# Patient Record
Sex: MALE | Race: OTHER | URBAN - METROPOLITAN AREA
[De-identification: names, ages, dates, MRNs, and addresses within clinical notes are randomized per-mention and may not be internally consistent; named-entity substitution may affect disease eponyms.]

---

## 2017-03-17 ENCOUNTER — OFFICE VISIT (OUTPATIENT)
Dept: INTERNAL MEDICINE CLINIC | Facility: CLINIC | Age: 47
End: 2017-03-17

## 2017-03-17 VITALS
HEIGHT: 65 IN | HEART RATE: 53 BPM | TEMPERATURE: 97 F | BODY MASS INDEX: 22.49 KG/M2 | RESPIRATION RATE: 12 BRPM | WEIGHT: 135 LBS | DIASTOLIC BLOOD PRESSURE: 79 MMHG | SYSTOLIC BLOOD PRESSURE: 114 MMHG

## 2017-03-17 DIAGNOSIS — R21 RASH AND NONSPECIFIC SKIN ERUPTION: Primary | ICD-10-CM

## 2017-03-17 PROCEDURE — 99212 OFFICE O/P EST SF 10 MIN: CPT | Performed by: INTERNAL MEDICINE

## 2017-03-17 PROCEDURE — 99202 OFFICE O/P NEW SF 15 MIN: CPT | Performed by: INTERNAL MEDICINE

## 2017-03-17 RX ORDER — CLOTRIMAZOLE AND BETAMETHASONE DIPROPIONATE 10; .64 MG/G; MG/G
1 CREAM TOPICAL 2 TIMES DAILY PRN
Qty: 45 G | Refills: 0 | Status: SHIPPED | OUTPATIENT
Start: 2017-03-17 | End: 2017-04-08

## 2017-03-17 NOTE — PROGRESS NOTES
HPI:    Patient ID: Samia Smith is a 42year old male. Rash  This is a new problem. The current episode started 1 to 4 weeks ago (3 to 4 weeks). The problem is unchanged.  The affected locations include the abdomen, left upper leg, right upper leg, left is warm. Rash noted. He is not diaphoretic. No cyanosis or erythema. Nails show no clubbing. Scaly patches noted on the arms, medial thighs and left lower abdomen              ASSESSMENT/PLAN:   1.  Rash and nonspecific skin eruption  Will start pt on lot

## 2017-04-06 NOTE — TELEPHONE ENCOUNTER
Pt states he was in to see  last month and prescribe cream clotrimazole-betamethasone 1-0.05 % External Cream  Pt states it's for a rash and itching, states it's really helping, but is out.  Please advise

## 2017-04-08 RX ORDER — CLOTRIMAZOLE AND BETAMETHASONE DIPROPIONATE 10; .64 MG/G; MG/G
1 CREAM TOPICAL 2 TIMES DAILY PRN
Qty: 45 G | Refills: 0 | Status: SHIPPED | OUTPATIENT
Start: 2017-04-08

## (undated) NOTE — MR AVS SNAPSHOT
[address], [address]  [address] [facility]  [phone]               Thank you for choosing us for your health care visit with Claudene Newborn, MD.  We are glad to serve you and happy to provide you with this s information, go to [email or web address] and click on the Sign Up Now link in the [employer] box. Enter your [employer] Activation Code exactly as it appears below along with your Zip Code and Date of Birth to complete the sign-up process.  If you do